# Patient Record
Sex: MALE | Race: WHITE | URBAN - METROPOLITAN AREA
[De-identification: names, ages, dates, MRNs, and addresses within clinical notes are randomized per-mention and may not be internally consistent; named-entity substitution may affect disease eponyms.]

---
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
## 2022-04-25 ENCOUNTER — OFFICE VISIT (OUTPATIENT)
Dept: DERMATOLOGY | Age: 22
End: 2022-04-25
Payer: COMMERCIAL

## 2022-04-25 VITALS — TEMPERATURE: 97 F

## 2022-04-25 DIAGNOSIS — L70.0 ACNE VULGARIS: Primary | ICD-10-CM

## 2022-04-25 PROCEDURE — 99213 OFFICE O/P EST LOW 20 MIN: CPT | Performed by: DERMATOLOGY

## 2022-04-25 PROCEDURE — 1036F TOBACCO NON-USER: CPT | Performed by: DERMATOLOGY

## 2022-04-25 PROCEDURE — G8421 BMI NOT CALCULATED: HCPCS | Performed by: DERMATOLOGY

## 2022-04-25 PROCEDURE — G8427 DOCREV CUR MEDS BY ELIG CLIN: HCPCS | Performed by: DERMATOLOGY

## 2022-04-25 RX ORDER — TRETINOIN 0.5 MG/G
CREAM TOPICAL
Qty: 20 G | Refills: 3 | Status: SHIPPED | OUTPATIENT
Start: 2022-04-25

## 2022-04-25 RX ORDER — FINASTERIDE 1 MG/1
1 TABLET, FILM COATED ORAL DAILY
COMMUNITY

## 2022-04-25 NOTE — PROGRESS NOTES
[facility] Dermat[facility]  Rosmery Spann  2000    25 y.o. male     Date of Visit: 4/25/2022    Chief Complaint: acne    History of Present Illness:    He returns today to follow-up for mixed inflammatory and comedonal acne of the face. His skin has improved but not cleared with the following regimen. He reports using clinda BPO gel every day without any misses. However, he uses tretinoin 0.025% cream only intermittently. Clinda BPO gel daily. Tretinoin 0.025% cream-added at last visit. Review of Systems:  Gen: Feels well, good sense of health. Past Medical History, Family History, Surgical History, Medications and Allergies reviewed. Past Medical History:   Diagnosis Date    ADHD (attention deficit hyperactivity disorder)     Autism     Seasonal allergies      History reviewed. No pertinent surgical history. No Known Allergies  Outpatient Medications Marked as Taking for the 4/25/22 encounter (Office Visit) with Jesus Blanton MD   Medication Sig Dispense Refill    finasteride (PROPECIA) 1 MG tablet Take 1 mg by mouth daily      ipratropium (ATROVENT) 0.06 % nasal spray 2 sprays by Nasal route 4 times daily as needed      ketoconazole (NIZORAL) 2 % shampoo Use to wash the scalp 3 times weekly. Leave on the scalp for 5 minutes prior to rinsing. 120 mL 5    tretinoin (RETIN-A) 0.025 % cream Apply pea-sized amount to entire face at bedtime. 20 g 3    clindamycin-benzoyl peroxide (BENZACLIN) 1-5 % gel Apply to the face daily for acne as needed. 50 g 2    sertraline (ZOLOFT) 25 MG tablet Take 25 mg by mouth      methylphenidate (CONCERTA) 36 MG extended release tablet 36 mg 1 time a day.  cetirizine (ZYRTEC) 10 MG tablet Take 10 mg by mouth daily           Physical Examination       The following were examined and determined to be normal: Psych/Neuro, Scalp/hair, Conjunctivae/eyelids and Gums/teeth/lips.     The following were examined and determined to be abnormal: Head/face and Neck. Well appearing. 1.  Forehead with several comedones. Forehead. Temples, cheeks, chin and neck with several erythematous papules and pustules. Assessment and Plan     1. Acne vulgaris, mixed inflammatory and comedonal - improved but not clear    Increase strength of tretinoin to 0.05% cream nightly. Encouraged consistency of use to improve results. Continue clinda/BPO gel daily. Return in about 6 months (around 10/25/2022).     --Catina Barnett MD

## 2022-11-07 ENCOUNTER — OFFICE VISIT (OUTPATIENT)
Dept: DERMATOLOGY | Age: 22
End: 2022-11-07
Payer: COMMERCIAL

## 2022-11-07 DIAGNOSIS — L70.0 ACNE VULGARIS: Primary | ICD-10-CM

## 2022-11-07 PROCEDURE — 99213 OFFICE O/P EST LOW 20 MIN: CPT | Performed by: DERMATOLOGY

## 2022-11-07 PROCEDURE — 1036F TOBACCO NON-USER: CPT | Performed by: DERMATOLOGY

## 2022-11-07 PROCEDURE — G8427 DOCREV CUR MEDS BY ELIG CLIN: HCPCS | Performed by: DERMATOLOGY

## 2022-11-07 PROCEDURE — G8484 FLU IMMUNIZE NO ADMIN: HCPCS | Performed by: DERMATOLOGY

## 2022-11-07 PROCEDURE — G8421 BMI NOT CALCULATED: HCPCS | Performed by: DERMATOLOGY

## 2022-11-07 NOTE — PROGRESS NOTES
[facility]  Josse Weeks MD  [phone]      Tracy Brushu  2000    23 y.o. male     Date of Visit: 11/7/2022    Chief Complaint: acne    History of Present Illness:    1. He returns today to follow-up for mixed inflammatory and comedonal acne affecting the face. He reports improved disease control with application of clinda BPO gel daily. He has not been using tretinoin 0.05% cream consistently. Review of Systems:  Gen: Feels well, good sense of health. Past Medical History, Family History, Surgical History, Medications and Allergies reviewed. Past Medical History:   Diagnosis Date    ADHD (attention deficit hyperactivity disorder)     Autism     Seasonal allergies      History reviewed. No pertinent surgical history. No Known Allergies  Outpatient Medications Marked as Taking for the 11/7/22 encounter (Office Visit) with Ratna Alvarado MD   Medication Sig Dispense Refill    finasteride (PROPECIA) 1 MG tablet Take 1 mg by mouth daily      tretinoin (RETIN-A) 0.05 % cream Apply a pea sized amount to the face QHS 20 g 3    ipratropium (ATROVENT) 0.06 % nasal spray 2 sprays by Nasal route 4 times daily as needed      ketoconazole (NIZORAL) 2 % shampoo Use to wash the scalp 3 times weekly. Leave on the scalp for 5 minutes prior to rinsing. 120 mL 5    clindamycin-benzoyl peroxide (BENZACLIN) 1-5 % gel Apply to the face daily for acne as needed. 50 g 2    sertraline (ZOLOFT) 25 MG tablet Take 25 mg by mouth      methylphenidate (CONCERTA) 36 MG extended release tablet 36 mg 1 time a day. cetirizine (ZYRTEC) 10 MG tablet Take 10 mg by mouth daily             Physical Examination       The following were examined and determined to be normal: Psych/Neuro, Scalp/hair, Conjunctivae/eyelids, Gums/teeth/lips, and Neck. The following were examined and determined to be abnormal: Head/face. Well appearing.     1.  Forehead > cheeks with several comedones and a rare erythematous papule. Assessment and Plan     1. Acne vulgaris - mixed inflammatory and comedonal, mild to moderate today    Resume tretinoin 0.05% cream nightly. Continue clinda/BPO gel daily. Return in about 1 year (around 11/7/2023).     --Yolanda Duval MD

## 2023-04-28 RX ORDER — KETOCONAZOLE 20 MG/ML
SHAMPOO TOPICAL
Qty: 120 ML | Refills: 5 | Status: SHIPPED | OUTPATIENT
Start: 2023-04-28

## 2023-05-23 ENCOUNTER — OFFICE VISIT (OUTPATIENT)
Dept: DERMATOLOGY | Age: 23
End: 2023-05-23
Payer: COMMERCIAL

## 2023-05-23 DIAGNOSIS — L05.01 PILONIDAL CYST WITH ABSCESS: Primary | ICD-10-CM

## 2023-05-23 PROCEDURE — G8421 BMI NOT CALCULATED: HCPCS | Performed by: DERMATOLOGY

## 2023-05-23 PROCEDURE — G8427 DOCREV CUR MEDS BY ELIG CLIN: HCPCS | Performed by: DERMATOLOGY

## 2023-05-23 PROCEDURE — 99213 OFFICE O/P EST LOW 20 MIN: CPT | Performed by: DERMATOLOGY

## 2023-05-23 PROCEDURE — 1036F TOBACCO NON-USER: CPT | Performed by: DERMATOLOGY

## 2023-05-23 RX ORDER — MINOCYCLINE HYDROCHLORIDE 100 MG/1
100 CAPSULE ORAL 2 TIMES DAILY
Qty: 20 CAPSULE | Refills: 0 | Status: SHIPPED | OUTPATIENT
Start: 2023-05-23 | End: 2023-06-02

## 2023-05-23 RX ORDER — CLINDAMYCIN AND BENZOYL PEROXIDE 10; 50 MG/G; MG/G
GEL TOPICAL
Qty: 50 G | Refills: 2 | Status: SHIPPED | OUTPATIENT
Start: 2023-05-23

## 2023-05-23 NOTE — PROGRESS NOTES
[facility]  Rosmery Douglass  2000    23 y.o. male     Date of Visit: 5/23/2023    Chief Complaint: cyst    History of Present Illness:    He presents today for a 1 week history of a painful lesion on the buttocks. Review of Systems:  Gen: Feels well, good sense of health. Past Medical History, Family History, Surgical History, Medications and Allergies reviewed. Past Medical History:   Diagnosis Date    ADHD (attention deficit hyperactivity disorder)     Autism     Seasonal allergies      History reviewed. No pertinent surgical history. No Known Allergies  Outpatient Medications Marked as Taking for the 5/23/23 encounter (Office Visit) with Augusta Guerra MD   Medication Sig Dispense Refill    clindamycin-benzoyl peroxide (BENZACLIN) 1-5 % gel Apply to the face daily for acne as needed. 50 g 2    minocycline (MINOCIN;DYNACIN) 100 MG capsule Take 1 capsule by mouth 2 times daily for 10 days 20 capsule 0    mupirocin (BACTROBAN) 2 % ointment Apply to affected area of the buttock twice daily for 10 days. 22 g 1    ketoconazole (NIZORAL) 2 % shampoo use to wash the scalp 3 times weekly. leave on the scalp for 5 minutes prior to rinnsing 120 mL 5    finasteride (PROPECIA) 1 MG tablet Take 1 tablet by mouth daily      tretinoin (RETIN-A) 0.05 % cream Apply a pea sized amount to the face QHS 20 g 3    ipratropium (ATROVENT) 0.06 % nasal spray 2 sprays by Nasal route 4 times daily as needed      sertraline (ZOLOFT) 25 MG tablet Take 1 tablet by mouth      methylphenidate (CONCERTA) 36 MG extended release tablet 36 mg 1 time a day. cetirizine (ZYRTEC) 10 MG tablet Take 1 tablet by mouth daily             Physical Examination     Well appearing. 1.  Left superior medial buttocks with a tender draining erythematous nodule. Assessment and Plan     1. Pilonidal cyst with abscess     Warm compresses twice daily.     Minocycline 100 mg

## 2023-06-07 ENCOUNTER — TELEPHONE (OUTPATIENT)
Dept: DERMATOLOGY | Age: 23
End: 2023-06-07

## 2023-06-07 NOTE — TELEPHONE ENCOUNTER
Pt mom requesting appt stated pt still has a boil it's smaller but still there  Please advise   C/b [phone]

## 2023-06-08 NOTE — TELEPHONE ENCOUNTER
Called and spoke to patient's mother Francisco J Cochran and offered 6/9 @ 9:45am OR 6/12 @ 4:45pm.    She will call back to schedule once she's able to check her schedule.

## 2023-10-05 RX ORDER — TRETINOIN 0.5 MG/G
CREAM TOPICAL
Qty: 20 G | Refills: 3 | Status: SHIPPED | OUTPATIENT
Start: 2023-10-05

## 2023-11-07 ENCOUNTER — OFFICE VISIT (OUTPATIENT)
Dept: DERMATOLOGY | Age: 23
End: 2023-11-07
Payer: COMMERCIAL

## 2023-11-07 DIAGNOSIS — L70.0 ACNE VULGARIS: Primary | ICD-10-CM

## 2023-11-07 DIAGNOSIS — L05.91 PILONIDAL CYST WITHOUT ABSCESS: ICD-10-CM

## 2023-11-07 PROCEDURE — G8420 CALC BMI NORM PARAMETERS: HCPCS | Performed by: DERMATOLOGY

## 2023-11-07 PROCEDURE — G8484 FLU IMMUNIZE NO ADMIN: HCPCS | Performed by: DERMATOLOGY

## 2023-11-07 PROCEDURE — G8427 DOCREV CUR MEDS BY ELIG CLIN: HCPCS | Performed by: DERMATOLOGY

## 2023-11-07 PROCEDURE — 1036F TOBACCO NON-USER: CPT | Performed by: DERMATOLOGY

## 2023-11-07 PROCEDURE — 99213 OFFICE O/P EST LOW 20 MIN: CPT | Performed by: DERMATOLOGY

## 2023-11-07 NOTE — PROGRESS NOTES
[facility]  Miranda Zhu MD  [phone]      Danielito Dumonth  2000    26 y.o. male     Date of Visit: 11/7/2023    Chief Complaint: acne, pilonidal cyst    History of Present Illness:    1. He returns today to follow-up for chronic mixed inflammatory and comedonal acne of the face. He'd doing well overall right now. Not using medications consistently. Prior meds:  Tretinoin 0.05% cream nightly. Clinda BPO gel daily. 2.  He has a history of a pilonidal cyst on the buttocks that was previously inflamed and associated with an abscess. The inflammation has resolved and it is no longer tender. Review of Systems:  Gen: Feels well, good sense of health. Past Medical History, Family History, Surgical History, Medications and Allergies reviewed. Past Medical History:   Diagnosis Date    ADHD (attention deficit hyperactivity disorder)     Autism     Seasonal allergies      History reviewed. No pertinent surgical history. Allergies   Allergen Reactions    Lactulose      Other reaction(s): Vomiting  Other reaction(s): Vomiting       Outpatient Medications Marked as Taking for the 11/7/23 encounter (Office Visit) with Ira Brown MD   Medication Sig Dispense Refill    tretinoin (RETIN-A) 0.05 % cream apply a pea sized amount to the face at bedtime 20 g 3    clindamycin-benzoyl peroxide (BENZACLIN) 1-5 % gel Apply to the face daily for acne as needed. 50 g 2    mupirocin (BACTROBAN) 2 % ointment Apply to affected area of the buttock twice daily for 10 days. 22 g 1    ketoconazole (NIZORAL) 2 % shampoo use to wash the scalp 3 times weekly.  leave on the scalp for 5 minutes prior to rinnsing 120 mL 5    finasteride (PROPECIA) 1 MG tablet Take 1 tablet by mouth daily      ipratropium (ATROVENT) 0.06 % nasal spray 2 sprays by Nasal route 4 times daily as needed      sertraline (ZOLOFT) 25 MG tablet Take 1 tablet by mouth      methylphenidate (CONCERTA) 36 MG extended

## 2023-11-07 NOTE — PATIENT INSTRUCTIONS
Resume tretinoin 0.05% cream nightly. Apply clindamycin/benzoyl peroxide gel in the morning as needed for spot treatment.

## 2024-06-11 RX ORDER — KETOCONAZOLE 20 MG/ML
SHAMPOO TOPICAL
Qty: 120 ML | Refills: 5 | Status: SHIPPED | OUTPATIENT
Start: 2024-06-11

## 2024-12-09 ENCOUNTER — INITIAL CONSULT (OUTPATIENT)
Dept: SURGERY | Age: 24
End: 2024-12-09
Payer: COMMERCIAL

## 2024-12-09 VITALS — SYSTOLIC BLOOD PRESSURE: 116 MMHG | DIASTOLIC BLOOD PRESSURE: 75 MMHG | HEART RATE: 77 BPM

## 2024-12-09 DIAGNOSIS — L05.91 PILONIDAL CYST: Primary | ICD-10-CM

## 2024-12-09 PROCEDURE — G8427 DOCREV CUR MEDS BY ELIG CLIN: HCPCS | Performed by: SURGERY

## 2024-12-09 PROCEDURE — 99213 OFFICE O/P EST LOW 20 MIN: CPT | Performed by: SURGERY

## 2024-12-09 PROCEDURE — 1036F TOBACCO NON-USER: CPT | Performed by: SURGERY

## 2024-12-09 PROCEDURE — G8421 BMI NOT CALCULATED: HCPCS | Performed by: SURGERY

## 2024-12-09 PROCEDURE — G8484 FLU IMMUNIZE NO ADMIN: HCPCS | Performed by: SURGERY

## 2024-12-09 NOTE — PROGRESS NOTES
Established Patient Visit    [facility]  [facility]   Clifford Azevedo MD    [address], [address]  [place], [place] [address]  Phone: [phone]  Fax: [phone]    Melecio Munguia   YOB: 2000    Date of Visit:  12/9/2024    No ref. provider found  Andrea Beck MD    Subjective:     Melecio Munguia presents for a follow-up of his pilonidal cyst with sinus tract.  He has seen both Dr. Schultz in our office along with Dr. Talbot at [facility] for his pilonidal cyst.  Overall he has been doing okay since his last visit.  He has intermittent pain in the area of his pilonidal cyst and sinus tract, mainly with palpation and doing sit ups.  He also has intermittent drainage of clear fluid.  He denies any fevers or chills.  Overall, per the patient's mother at bedside, there is been no significant change over the past year.         Allergies   Allergen Reactions    Lactulose      Other reaction(s): Vomiting  Other reaction(s): Vomiting       Outpatient Medications Marked as Taking for the 12/9/24 encounter (Initial consult) with Clifford Azevedo MD   Medication Sig Dispense Refill    ketoconazole (NIZORAL) 2 % shampoo use to wash the scalp 3 times weekly. leave on the scalp for 5 minutes prior to rinnsing 120 mL 5    tretinoin (RETIN-A) 0.05 % cream apply a pea sized amount to the face at bedtime 20 g 3    clindamycin-benzoyl peroxide (BENZACLIN) 1-5 % gel Apply to the face daily for acne as needed. 50 g 2    mupirocin (BACTROBAN) 2 % ointment Apply to affected area of the buttock twice daily for 10 days. 22 g 1    finasteride (PROPECIA) 1 MG tablet Take 1 tablet by mouth daily      ipratropium (ATROVENT) 0.06 % nasal spray 2 sprays by Nasal route 4 times daily as needed      sertraline (ZOLOFT) 25 MG tablet Take 1 tablet by mouth      methylphenidate (CONCERTA) 36 MG extended release tablet 36 mg 1 time a day.      cetirizine (ZYRTEC) 10 MG

## 2025-02-18 ENCOUNTER — OFFICE VISIT (OUTPATIENT)
Age: 25
End: 2025-02-18
Payer: COMMERCIAL

## 2025-02-18 DIAGNOSIS — L05.91 PILONIDAL CYST WITHOUT ABSCESS: ICD-10-CM

## 2025-02-18 DIAGNOSIS — L64.9 MALE PATTERN ALOPECIA: ICD-10-CM

## 2025-02-18 DIAGNOSIS — L70.0 ACNE VULGARIS: Primary | ICD-10-CM

## 2025-02-18 PROCEDURE — 99213 OFFICE O/P EST LOW 20 MIN: CPT | Performed by: DERMATOLOGY

## 2025-02-18 PROCEDURE — G8427 DOCREV CUR MEDS BY ELIG CLIN: HCPCS | Performed by: DERMATOLOGY

## 2025-02-18 PROCEDURE — 1036F TOBACCO NON-USER: CPT | Performed by: DERMATOLOGY

## 2025-02-18 PROCEDURE — G8421 BMI NOT CALCULATED: HCPCS | Performed by: DERMATOLOGY

## 2025-02-18 NOTE — PROGRESS NOTES
[facility]  Ned Higgins MD  [phone]      Melecio Munguia  2000    24 y.o. male     Date of Visit: 2/18/2025    Chief Complaint: acne, cyst    History of Present Illness:    He is here today with his mother.    1.  He returns today to follow-up for chronic inflammatory acne vulgaris.  He reports improvement over time despite not using tretinoin 0.05% cream or clinda BPO gel consistently.  He will occasionally get lesions on the lower face and neck.    2.  He also has a persistent pilonidal cyst on the left superior buttock that occasionally drains.  He complains of some pain with doing sit up and certain exercises.  He has seen 3 surgeons who recommended observation at this time rather than excision.    3.  He also has chronic male pattern hair loss-reports using topical minoxidil and oral finasteride through an online company ([employer]).  He denies any adverse effects of treatment.      Review of Systems:  Gen: Feels well, good sense of health.      Past Medical History, Family History, Surgical History, Medications and Allergies reviewed.    Past Medical History:   Diagnosis Date    ADHD (attention deficit hyperactivity disorder)     Autism     Seasonal allergies      History reviewed. No pertinent surgical history.    Allergies   Allergen Reactions    Lactulose      Other reaction(s): Vomiting  Other reaction(s): Vomiting       Outpatient Medications Marked as Taking for the 2/18/25 encounter (Office Visit) with Ned Higgins MD   Medication Sig Dispense Refill    ketoconazole (NIZORAL) 2 % shampoo use to wash the scalp 3 times weekly. leave on the scalp for 5 minutes prior to rinnsing 120 mL 5    mupirocin (BACTROBAN) 2 % ointment Apply to affected area of the buttock twice daily for 10 days. 22 g 1    finasteride (PROPECIA) 1 MG tablet Take 1 tablet by mouth daily      ipratropium (ATROVENT) 0.06 % nasal spray 2 sprays by Nasal route 4 times daily as needed      sertraline

## 2025-06-23 RX ORDER — KETOCONAZOLE 20 MG/ML
SHAMPOO, SUSPENSION TOPICAL
Qty: 120 ML | Refills: 5 | Status: SHIPPED | OUTPATIENT
Start: 2025-06-23